# Patient Record
Sex: MALE | Race: BLACK OR AFRICAN AMERICAN | URBAN - METROPOLITAN AREA
[De-identification: names, ages, dates, MRNs, and addresses within clinical notes are randomized per-mention and may not be internally consistent; named-entity substitution may affect disease eponyms.]

---

## 2017-09-07 ENCOUNTER — APPOINTMENT (OUTPATIENT)
Age: 72
Setting detail: DERMATOLOGY
End: 2017-09-08

## 2017-09-07 VITALS — WEIGHT: 143 LBS | DIASTOLIC BLOOD PRESSURE: 81 MMHG | HEIGHT: 71 IN | SYSTOLIC BLOOD PRESSURE: 142 MMHG

## 2017-09-07 DIAGNOSIS — L28.1 PRURIGO NODULARIS: ICD-10-CM

## 2017-09-07 PROCEDURE — 99202 OFFICE O/P NEW SF 15 MIN: CPT

## 2017-09-07 PROCEDURE — OTHER COUNSELING: OTHER

## 2017-09-07 PROCEDURE — OTHER MIPS QUALITY: OTHER

## 2017-09-07 PROCEDURE — OTHER PRESCRIPTION: OTHER

## 2017-09-07 PROCEDURE — OTHER PATIENT SPECIFIC COUNSELING: OTHER

## 2017-09-07 RX ORDER — FLUOCINONIDE 0.5 MG/G
CREAM TOPICAL
Qty: 1 | Refills: 3 | Status: ERX | COMMUNITY
Start: 2017-09-07

## 2017-09-07 ASSESSMENT — LOCATION SIMPLE DESCRIPTION DERM
LOCATION SIMPLE: LEFT FOREARM
LOCATION SIMPLE: LEFT UPPER ARM
LOCATION SIMPLE: RIGHT UPPER ARM
LOCATION SIMPLE: RIGHT FOREARM

## 2017-09-07 ASSESSMENT — LOCATION ZONE DERM: LOCATION ZONE: ARM

## 2017-09-07 ASSESSMENT — LOCATION DETAILED DESCRIPTION DERM
LOCATION DETAILED: LEFT PROXIMAL DORSAL FOREARM
LOCATION DETAILED: RIGHT PROXIMAL DORSAL FOREARM
LOCATION DETAILED: RIGHT DISTAL POSTERIOR UPPER ARM
LOCATION DETAILED: LEFT DISTAL POSTERIOR UPPER ARM
LOCATION DETAILED: LEFT PROXIMAL POSTERIOR UPPER ARM
LOCATION DETAILED: RIGHT PROXIMAL POSTERIOR UPPER ARM

## 2017-09-07 NOTE — PROCEDURE: PATIENT SPECIFIC COUNSELING
Instructed patient to begin treatment regimen of fluocinonide 0.05% topical cream twice daily to affected areas as directed. Instructed patient to ensure that all affected areas have been lathered with topical cream. Recommended that patient continue use of sunscreen while outdoors to allow for protection from sun exposure. The patient is agreeable with treatment regimen. The patient will follow up in 3-4 months for re-evaluation.
Detail Level: Simple

## 2017-09-07 NOTE — HPI: RASH
How Severe Is Your Rash?: mild
Is This A New Presentation, Or A Follow-Up?: Rash
Additional History: Used OTC itch relief last year for three months, and the patient states it did not resolve condition or symptoms. He discontinued use after three months. Has states he has been applying sunscreen for three weeks when sun exposed, and he believes rash has improved with use of sun screen. \\n\\nPain level: 0/10